# Patient Record
Sex: FEMALE | URBAN - METROPOLITAN AREA
[De-identification: names, ages, dates, MRNs, and addresses within clinical notes are randomized per-mention and may not be internally consistent; named-entity substitution may affect disease eponyms.]

---

## 2017-02-07 DIAGNOSIS — H90.3 BILATERAL SENSORINEURAL HEARING LOSS: Primary | ICD-10-CM

## 2017-02-14 ENCOUNTER — OFFICE VISIT (OUTPATIENT)
Dept: AUDIOLOGY | Facility: CLINIC | Age: 37
End: 2017-02-14

## 2017-02-14 DIAGNOSIS — H90.3 SENSORY HEARING LOSS, BILATERAL: Primary | ICD-10-CM

## 2017-02-14 NOTE — Clinical Note
Patient is has never worn hearing aids and lipreads.  Went to [facility] in [place].  We fit her with loaner aids for testing.  She was very pleased with benefit from left aid so I encouraged her to trial a hearing aid before deciding on CI.  She understand if she gets CI that we would only expect sound awareness and benefit to lipreading, rather than open set understanding of speech.  I would have her see health psychology if she does elect to proceed with CI.

## 2017-02-14 NOTE — PROGRESS NOTES
"AUDIOLOGY REPORT    PURPOSE: To evaluate candidacy for cochlear implant (CI). Candidacy requires at least a moderate to profound sensorineural hearing loss in both ears, good general health, lack of benefit from conventional amplification as determined from the tests below, psychological/emotional stability and motivationally suitable, with realistic expectations, and absence of medical conditions contraindicating surgical intervention.     BACKGROUND:  Heather Khanna was seen on 2/14/2017 in Audiology at the [facility] for a cochlear implant evaluation, which was ordered by Dr. Priscila Osorio.  She was accompanied by her boyfriend Jeronimo and her father Masoud.  Heather Khanna has been diagnosed with severe to profound sensorineural hearing loss bilaterally.  Patient communicates by lipreading her family and friends.  She is interested in exploring options to give her sound to help with lipreading.      Onset of hearing loss: Unsure  Identification of hearing loss: 2nd grade  Onset of profound hearing loss: Unsure  Etiology of hearing loss: Unknown  Sudden or Progressive: Progressive  Age Hearing Aid fit: Age 13 bilaterally  Duration of Hearing Aid use: Wore only for short period after fitting at age 13; Unaided most of her life.   Current Hearing Aid Type: Not aided  Age of current Hearing Aid: Not aided  Tinnitus: Present bilaterally  Balance: OK  Other:  Chemo for breast cancer 8 years ago  Does patient exhibit intelligible vocalizations?  Yes  Primary Mode of Communication: Lipreading (Patient attended [facility] [employer] [facility] [employer] where she learned to sign but she reports she is \"out of practice\" since she uses lipreading to communicate now).    Previous Surgeries: Surgery for breast cancer, hysterectomy  Previous Ear Surgeries: None    TEST RESULTS:    Audiometric Results (see audiogram): Profound sensorineural hearing loss bilaterally.  Could not test " speech understanding due to severity of loss.   Tympanograms: hypermobile bilaterally  Acoustic Reflexes: Absent bilaterally  Otoacoustic emissions: Refer bilaterally    Device(s) used for Aided Testing:   Bilateral clinic radhaaner Brit V UP with Comply tips.  Used for testing but patient did not take the aids home.      Electroacoustic Test Results: Hearing aids were set to approximately full on and were still not able to meet real ear targets, due to the severity of the loss.  Aids were working up to specifications on an electroacoustic test.     45 minutes were spent assessing the patient s auditory rehabilitation status in order to determine whether conventional amplification is beneficial or whether the patient is an audiologic candidate for a cochlear implant.      Soundfield Thresholds (see audiogram):   Right aided: Detection in severe rising to moderate sloping to severe to profound loss range.  Left aided: Detection in moderate to moderately severe loss range.    CNC Words Test: The patient repeats 25 single syllable words, auditory only. The words are presented at 60 dB A (conversational level) delivered from a CD player.     Left ear aided: 0%  Right ear aided: 0%  Bilaterally aided: 4%    AzBio Sentences Test: The patient repeats 20 sentences, auditory only.  The sentences are presented at 60 dB A (conversational level) delivered from a CD player.       Left ear aided: 0%  Right ear aided: 0%  Bilaterally aided: 0%  Patient reported she could pick out the different voices (male versus female) and could hear the syllables but could not understand speech when wearing the left device.  She couldn't with the right device.    MAC Prelingual Test Battery:    4 Choice Spondee: The patient selects spondee from closed set of 4.  Presented at 60 dB HL, delivered from a CD player.    Bilaterally aided: 50%       EAR RECOMMENDED FOR IMPLANTATION: either    REASON: Both ears are meeting the audiological criteria for  a cochlear implant.  Patient felt she got more aided benefit on the left side so that ear may be the better candidate audiologically but she would prefer to have the right ear implanted, if she elects to proceed.  Patient reported she did not like the sound of the right hearing aid but was very pleased with the quality and benefit noted from the left loaner aid today.      COMMENTS: The following topics were discussed with the patient:   -How a cochlear implant functions   -Realistic expectations for performance outcomes; patient has never worn hearing aids consistently, therefore expectations of sound awareness, detection, and supplement to lip reading are appropriate.  We would not expect open set speech understanding, given her history.    -The adjustment process; for her this may take longer than candidates who have worn hearing aids previously and have been exposed to sound   -The possible benefits, risks, and limitations of cochlear implants. Patient expressed concerns regarding risk for dizziness or imbalance and limitations of sport activities, as she participates in roller derby.  She will discuss with surgeon.  -The necessary follow up visits in Audiology and the recommendation for aural rehabilitation therapy    The 3 available cochlear implant devices were not discussed today.  Brochures were sent home with patient.      SUMMARY AND RECOMMENDATIONS: Patient has a profound sensorineural hearing loss bilaterally and is audiologically a candidate for a cochlear implant in either ear. Given that the patient has not consistently worn hearing aids in the past, and because she appreciated benefit for sound awareness and pattern perception with the left hearing aid, it was recommended that she consider a trial with amplification prior to proceeding with an implant. While she did not care for the sound quality of the right hearing aid, it was discussed that she could try two hearing aids to determine benefit. She  will discuss her options with her family and will contact the clinic to update us on how she would like to proceed (e.g., proceed with team cochlear implant evaluation, or wait and trial hearing aids). If she wishes to consider implantation further, she would need CT, consultation with surgeon Dr. Priscila Osorio and health psychologist, and device selection visit in Audiology.  We will wait to hear from her regarding next steps.  All questions were answered and the patient expressed understanding of recommendations moving forward.     Glendy Knutson M.A.  Audiology Extern  Temporary License #[id number]       I was present with the patient for the entire Audiology appointment including all procedures/testing performed by the AuD student, and agree with the student s assessment and plan as documented.    Eduardo Koo, CCC-A  Licensed Audiologist  [place] #[id number]      Enclosure: audiogram    Cc Priscila Osorio MD

## 2017-02-14 NOTE — MR AVS SNAPSHOT
After Visit Summary   2017    Heather Khanna    MRN: [id number]           Patient Information     Date Of Birth          1980        Visit Information        Provider Department      2017 9:30 AM Liliya Woodard AuD [facility] Audiology        Today's Diagnoses     Sensory hearing loss, bilateral    -  1       Follow-ups after your visit        Who to contact     Please call your clinic at [phone] to:    Ask questions about your health    Make or cancel appointments    Discuss your medicines    Learn about your test results    Speak to your doctor   If you have compliments or concerns about an experience at your clinic, or if you wish to file a complaint, please contact [facility] Patient Relations at [phone] or email us at [email or web address]         Additional Information About Your Visit        MyChart Information     [employer] is an electronic gateway that provides easy, online access to your medical records. With [employer], you can request a clinic appointment, read your test results, renew a prescription or communicate with your care team.     To sign up for [employer] visit the website at [email or web address]   You will be asked to enter the access code listed below, as well as some personal information. Please follow the directions to create your username and password.     Your access code is: [email or web address]  Expires: 2017  1:38 PM     Your access code will  in 90 days. If you need help or a new code, please contact your [facility] Clinic or call [phone] for assistance.        Care EveryWhere ID     This is your Care EveryWhere ID. This could be used by other organizations to access your [place] medical records  [id number]         Blood Pressure from Last 3 Encounters:   No data found for BP    Weight from Last 3 Encounters:   No data found for Wt              We Performed the Following      AUDIOGRAM/TYMPANOGRAM - INTERFACE     Audiometry/Air and Bone   (29718)     Electro Acoustic Hearing Aid Test, Binaural (78798)     Eval of Aud Rehab Status (60 min)   (12248)     Otoacoustic Emissions - Limited   (30153)     Tymps / Reflex   (16907)        Primary Care Provider    None Specified       No primary provider on file.        Thank you!     Thank you for choosing [facility] AUDIOLOGY  for your care. Our goal is always to provide you with excellent care. Hearing back from our patients is one way we can continue to improve our services. Please take a few minutes to complete the written survey that you may receive in the mail after your visit with us. Thank you!             Your Updated Medication List - Protect others around you: Learn how to safely use, store and throw away your medicines at www.disposemymeds.org.      Notice  As of 2/14/2017 11:59 PM    You have not been prescribed any medications.